# Patient Record
(demographics unavailable — no encounter records)

---

## 2024-12-17 NOTE — ASSESSMENT
[FreeTextEntry1] : 17yo with class 3 severe obesity doing well on wegovy 1.7mg though now 2 weeks since last dose and some concerns re: out of pocket costs. max weight 309, now 272 - discussed equivalence of wegovy/ozempic and rec change to ozempic 2mg/week - Administer the first dose of Ozempic 2 mg today to avoid further delay in treatment. Be aware that restarting after a two-week gap may cause mild side effects such as nausea or feeling unwell. Report any severe or persistent symptoms to the clinic. - Continue with Ozempic 2 mg as the maintenance dose. Each pen contains four doses of 2 mg. Monitor usage and notify the clinic when running low to plan for continuation, as Ozempic is not covered by insurance for this patient. - encouraged to see therapist at school, see PHQ/MADELEINE

## 2024-12-17 NOTE — HISTORY OF PRESENT ILLNESS
[FreeTextEntry1] : Weight Management   - Currently on Wegovy 1.7 mg but has not taken a dose in 2 weeks.   - Reports no issues with self-administration of medication.   - interested in switching to Ozempic 2 mg due to financial constraints and availability through her dad's prescription.   - No reported side effects such as vomiting, belly pain, diarrhea, constipation, or nausea while on Wegovy.   - Foods: Patient's parents send groceries to her, and she sometimes orders food via [employer].    Menstrual periods regular, ran out of iron Taking flovent as prescribed  Stress and Mood   - Reports feeling stressed, particularly related to academic pressures and concerns about meeting personal expectations.   - Expressed self-doubt about academic and career goals.

## 2024-12-19 NOTE — ASSESSMENT
[Case reviewed with RD. Please see RD note for additional details such as lifestyle habits] : Case reviewed with RD. Please see RD note for additional details such as lifestyle habits and specific behavioral goals [FreeTextEntry1] : 17yo with class 3 severe obesity doing well on wegovy 1.7mg though now 2 weeks since last dose and some concerns re: out of pocket costs. max weight 309, now 272 - discussed equivalence of wegovy/ozempic and rec change to ozempic 2mg/week - Administer the first dose of Ozempic 2 mg today to avoid further delay in treatment. Be aware that restarting after a two-week gap may cause mild side effects such as nausea or feeling unwell. Report any severe or persistent symptoms to the clinic. - Continue with Ozempic 2 mg as the maintenance dose. Each pen contains four doses of 2 mg. Monitor usage and notify the clinic when running low to plan for continuation, as Ozempic is not covered by insurance for this patient. - encouraged to see therapist at school, see PHQ/MADELEINE

## 2025-06-03 NOTE — REASON FOR VISIT
[Mother] : mother [Home] : at home, [unfilled] , at the time of the visit. [Other Location: e.g. Home (Enter Location, City,State)___] : at [unfilled] [Telehealth (audio & video)] : This visit was provided via telehealth using real-time 2-way audio visual technology. [Verbal consent obtained from patient] : the patient, [unfilled] [Follow-up Weight Management] : a follow-up weight management visit

## 2025-06-03 NOTE — ASSESSMENT
[Case reviewed with RD. Please see RD note for additional details such as lifestyle habits] : Case reviewed with RD. Please see RD note for additional details such as lifestyle habits and specific behavioral goals [FreeTextEntry1] : 17yo with class 3 severe obesity doing well on wegovy 1.7mg though now 2 weeks since last dose and some concerns re: out of pocket costs. max weight 309, now 244 - repeat fasting labs - continue ozempic 2mg/week - increase nutrient dense foods, open to adding protein bar or nuts/cheese to piece of fruit in the morning - refer to [facility] for help finding therapist - has f/u with me in July, needs f/u with RD

## 2025-06-03 NOTE — HISTORY OF PRESENT ILLNESS
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 - continues wegovy 1.7mg/week - diarrhea whenever she overeats, last time was about month ago, got pizza with study group - typical day: generally skips breakfast or has piece of fruit or fruit snacks; lunch: chicken, veg, rice; dinner: sometimes forgets  - yesterday: skipped breakfast, chicken sandwich (lettuce, tomatoes) and fries, ate half, had other half for dinner, grapefruit, icey, drinking a lot of water - still having "mood swings" but not as bad as it used to, feels like she's able to cool herself down - had hard time finding therapist covered by plan, would appreciate help from [facility] - generally quite active at school but has not been since getting home, just started summer classes yesterday - planning to start going to gym

## 2025-07-29 NOTE — HISTORY OF PRESENT ILLNESS
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  Meds - continues on ozempic  2mg,  - denies nausea, vomiting, constipation, diarrhea, stools 1x/day - fexofenadine - ferrous sulfate - not taking any asthma meds  dysmenorrhea - using naproxen with good effect  regular 20-22 day cycles  current diet - "terrible" b/c at home, nt eating full meals, more small snacks - working 11-7am, will finish turkey sandwich from lunch box, then sleep until 4pm - 4pm - chicken, rice, broccoli - 2am - packed lunch, usually protein and starch, sometimes veggie (cucumbers, cabbage)  - fruit as snacks  physical activity - works out in her room 15 min video 2-4x/week, otherwise not a lot

## 2025-07-29 NOTE — ASSESSMENT
[FreeTextEntry1] : 17yo originally with class 3 obesity and comorbidities: prediabetes (now resolved), menorrahgia and dysmenorrhea, iron def anemia Currently making lifestyle changes, taking ozempic 2mg weekly and weight continues decreasing.   Highest weight: 323lb (10/2023); BMI 54 current weight: 234lb (28% loss); BMI 39   Plan: - continue lifestyle therapy, celebrated successes - meds: cont ozempic 2mg/week - labs: reviewd june results (hdl improving, hba1c now normal) - referrals: none - f/u scheduled with me in Oct, should see RD after that